# Patient Record
(demographics unavailable — no encounter records)

---

## 2024-10-15 NOTE — BEGINNING OF VISIT
[Patient] : patient [] :  [[employer] ] : provided by [employer]   [Time Spent: ____ minutes] : Total time spent using  services: [unfilled] minutes. The patient's primary language is not English thus required  services. [Interpreters_IDNumber] : [id number] [Interpreters_FullName] : Gómez [TWNoteComboBox1] : [place]an

## 2024-10-15 NOTE — DISCUSSION/SUMMARY
[] : The components of the vaccine(s) to be administered today are listed in the plan of care. The disease(s) for which the vaccine(s) are intended to prevent and the risks have been discussed with the caretaker.  The risks are also included in the appropriate vaccination information statements which have been provided to the patient's caregiver.  The caregiver has given consent to vaccinate. [FreeTextEntry1] : 13 year old male presents to clinic for vaccine administration. 1. Vaccine administration -The following vaccines were administered without difficulty: Hepatitis B and Varicella. -Advised patient to apply cool compress or ice pack to arm if having pain, and use of OTC fever reducing agents such as Tylenol or Ibuprofen if he develops fever. -Updated copy of CIR provided to patient. -Provided patient with VIS and vaccine consent form for parent to review for highly recommended vaccines. Instructed patient should his parents consent to the vaccines, return the vaccine consent form and schedule encounter for immunizations. Pt verbalized understanding.  2.  -[facility] performed and reviewed with patient. No positive indicators noted. Anticipatory guidance provided.  Pt will RTC as needed.

## 2024-10-15 NOTE — HISTORY OF PRESENT ILLNESS
[FreeTextEntry6] : 13 year old male presents to clinic today for vaccine administration. CIR reviewed, pt due for Hepatitis B and Varicella. Consent signed by mother on chart. Pt denies any adverse reactions to vaccines in the past. Pt feels well, denies any s/s of illness at present.

## 2024-10-15 NOTE — RISK ASSESSMENT
[Grade: ____] : Grade: [unfilled] [Eats meals with family] : eats meals with family [Has family members/adults to turn to for help] : has family members/adults to turn to for help [Normal Performance] : normal performance [Eats regular meals including adequate fruits and vegetables] : eats regular meals including adequate fruits and vegetables [Drinks non-sweetened liquids] : drinks non-sweetened liquids  [Calcium source] : calcium source [Has friends] : has friends [Has interests/participates in community activities/volunteers] : has interests/participates in community activities/volunteers [Home is free of violence] : home is free of violence [Has peer relationships free of violence] : has peer relationships free of violence [With Teen] : teen [Has ways to cope with stress] : has ways to cope with stress [Displays self-confidence] : displays self-confidence [Gets depressed, anxious, or irritable/has mood swings] : gets depressed, anxious, or irritable/has mood swings [Is permitted and is able to make independent decisions] : Is not permitted and is not able to make independent decisions [Has concerns about body or appearance] : does not have concerns about body or appearance [At least 1 hour of physical activity a day] : does not do at least 1 hour of physical activity a day [Screen time (except homework) less than 2 hours a day] : no screen time (except homework) less than 2 hours a day [Uses tobacco] : does not use tobacco [Uses drugs] : does not use drugs  [Drinks alcohol] : does not drink alcohol [Uses safety belts/safety equipment] : does not use safety belts/safety equipment  [Impaired/distracted driving] : no impaired/distracted driving [Has/had oral sex] : has not had oral sex [Has had sexual intercourse] : has not had sexual intercourse [Has problems with sleep] : does not have problems with sleep [de-identified] : Lives with mom, sister, cousin, neices, and sister-in-law [de-identified] : [employer]icultural [employer] [de-identified] : Enjoys walks outside [de-identified] : Identifies as male; Interested in females only. Not currently in a relationship. [de-identified] : Listens to music to relax and lay down

## 2025-02-05 NOTE — HISTORY OF PRESENT ILLNESS
[FreeTextEntry6] : 14 year old male presents to [facility] for complaints of stomach ache and nausea without vomiting Onset of symptoms: 1 week ago The symptoms are intermittent He states it starts in the morning and while at school Eating something sweet improves his symptoms Pain at present is 8-9 out of 10 He feels like he wants to throw up but he can't  24 hour food recall: It is 11:35am, he ate: rice with meat stew around 7am, he ate almost half of it, he stopped eating because his stomach felt full Last night for dinner, he ate: rice with meat stew (in the stew there were onions, peppers, and tomato) Lunch: rice with meat stew Breakfast: did not eat He states he eats fruits and vegetables in soups and salads but cannot quantify daily servings  Last BM: yesterday, formed, hard to pass; prior to that his last stool was: "he doesn't remember" Reports sometimes he suffers from constipation He usually takes a pill or drinks water to help digestion; last time he took the pill for digestion was 11 days ago

## 2025-02-05 NOTE — BEGINNING OF VISIT
[Patient] : patient [] :  [[employer] ] : provided by [employer]   [Time Spent: ____ minutes] : Total time spent using  services: [unfilled] minutes. The patient's primary language is not English thus required  services. [Interpreters_IDNumber] : [id number]; [id number] [Interpreters_FullName] : Leonarda Miranda [TWNoteComboBox1] : Chinese

## 2025-02-05 NOTE — PHYSICAL EXAM
[NL] : regular rate and rhythm, normal S1, S2 audible, no murmurs [Soft] : soft [Normal Bowel Sounds] : normal bowel sounds [Distended] : nondistended [Hepatosplenomegaly] : no hepatosplenomegaly [FreeTextEntry9] : Tenderness upon palpation of the RLQ, over umbilical region, and LLQ

## 2025-02-05 NOTE — DISCUSSION/SUMMARY
[FreeTextEntry1] : 14 year old male presents to [facility] for stomach ache and nausea without vomiting. -Provided patient with Kaopectate and Ondansetron for stomach pain relief and nausea without vomiting. -Based on HPI it sounds like patient is experiencing constipation which may be causing him stomach ache and nausea. If pain and nausea improve, writer suggests pt use daily laxative to regulate bowel habits to daily and with ease. Dispensed 1 bottle of Polyethylene Glycol, begin by taking 17 g po daily. Expect after daily dose, BM within 1-3 days. -Counseled on increased fiber and water intake. Provided food diary for patient to log food intake. -Increase fruit juice (apple, pear, cherry, grape, prune). Note: Citrus fruit juices are not helpful. Vegetable juices are also helpful. -Add fruits and vegetables high in fiber content. Examples are peas, beans, broccoli, bananas, apricots, peaches, pears, figs, prunes, or dates. Offer these foods 2 or more times per day. Myth: bananas and apples make constipation worse. No evidence for this. They actually contain fiber and make stools softer. -Increase whole grain foods. Examples are bran flakes or muffins, belen crackers, and oatmeal. Brown rice and whole wheat bread are also helpful. Popcorn can be helpful. -Limit milk products (milk, ice cream, cheese, yogurt) to 3 servings per day. -Fluids: Give enough fluids to stay well hydrated. Reason: This keeps the stool soft. -Avoid waiting to stool; when your body signals to defecate go to the bathroom; do not hold in until you arrive home from school. The urge to go may pass by then and leave you feeling full and unable to have BM. -Set up a normal stool routine. How long to sit: about 5 minutes. When to sit: about 20 minutes after meals. This is especially important after breakfast. Reason: The best time to get strong contractions in the rectum. -The above recommendations were translated into Amharic on handout for [employer]. -Unable to reach Solis's mother at this time. TE to school to notify them to outreach family to  [employer] from school today.  RTC on 2/7/25 for follow-up. Appointment scheduled.